# Patient Record
Sex: MALE
[De-identification: names, ages, dates, MRNs, and addresses within clinical notes are randomized per-mention and may not be internally consistent; named-entity substitution may affect disease eponyms.]

---

## 2024-08-05 PROBLEM — Z00.00 ENCOUNTER FOR PREVENTIVE HEALTH EXAMINATION: Status: ACTIVE | Noted: 2024-08-05

## 2024-08-07 ENCOUNTER — APPOINTMENT (OUTPATIENT)
Dept: OTOLARYNGOLOGY | Facility: CLINIC | Age: 54
End: 2024-08-07

## 2024-08-07 PROBLEM — Z87.891 FORMER SMOKER: Status: ACTIVE | Noted: 2024-08-07

## 2024-08-07 PROBLEM — Z78.9 SOCIAL ALCOHOL USE: Status: ACTIVE | Noted: 2024-08-07

## 2024-08-07 PROBLEM — Z82.49 FAMILY HISTORY OF CARDIAC DISORDER: Status: ACTIVE | Noted: 2024-08-07

## 2024-08-07 PROBLEM — Z86.39 HISTORY OF HYPERCHOLESTEROLEMIA: Status: RESOLVED | Noted: 2024-08-07 | Resolved: 2024-08-07

## 2024-08-07 PROBLEM — Z78.9 CAFFEINE USE: Status: ACTIVE | Noted: 2024-08-07

## 2024-08-07 PROBLEM — H90.3 SENSORINEURAL HEARING LOSS (SNHL) OF BOTH EARS: Status: ACTIVE | Noted: 2024-08-07

## 2024-08-07 PROBLEM — H93.299 ABNORMAL AUDITORY PERCEPTION: Status: ACTIVE | Noted: 2024-08-07

## 2024-08-07 PROCEDURE — 92557 COMPREHENSIVE HEARING TEST: CPT

## 2024-08-07 PROCEDURE — 92504 EAR MICROSCOPY EXAMINATION: CPT

## 2024-08-07 PROCEDURE — 92550 TYMPANOMETRY & REFLEX THRESH: CPT | Mod: 52

## 2024-08-07 PROCEDURE — 99203 OFFICE O/P NEW LOW 30 MIN: CPT

## 2024-08-07 NOTE — CONSULT LETTER
[Please see my note below.] : Please see my note below. [FreeTextEntry2] : Dear NO REEVES  [FreeTextEntry1] : Thank you for allowing me to participate in the care of SARAH CORREA . Please see the attached visit note.    Mac[facility] Le[facility] Medical Director of [facility] Department of Otolaryngology [facility]

## 2024-08-07 NOTE — PHYSICAL EXAM
[Normal] : mucosa is normal [Midline] : trachea located in midline position [FreeTextEntry1] : Procedure: Microscopic Ear Exam  Left ear:  Ear canal intact without inflammation or lesion.   Tympanic membrane intact without inflammation.  Right ear:  Ear canal intact without inflammation or lesion.   Tympanic membrane intact without inflammation.

## 2024-08-07 NOTE — DATA REVIEWED
[de-identified] : In light of the patients current symptoms, Complete audiometry was ordered and completed today. I have interpreted these results and reviewed them in detail with the patient.  Bilateral sensorineural hearing loss affecting the right ear greater than the left with impaired speech recognition

## 2024-08-07 NOTE — HISTORY OF PRESENT ILLNESS
[de-identified] : SARAH CORREA has a history of hearing loss of long duration. Used amplification without satisfactory results.  Having increasing difficulty with communication; missing many conversations. Using speech reading heavily.  Sudden dizziness 2 weeks ago upon arising from sitting position described as imbalance without vertigo. No symptoms of rest. Treated and released from ED with no serious illness identified.  FH: No hearing loss. PMH: denies prior hospitalizations; no  or childhood infections reported.

## 2024-08-07 NOTE — ASSESSMENT
[FreeTextEntry1] : I have reviewed the audiometric findings in detail and the management options. I have recommended considering amplification for hearing loss and offered a referral to the [facility].   Follow-up with repeat audiometry in 6 months.

## 2024-08-19 ENCOUNTER — APPOINTMENT (OUTPATIENT)
Dept: OTOLARYNGOLOGY | Facility: CLINIC | Age: 54
End: 2024-08-19

## 2024-08-20 ENCOUNTER — APPOINTMENT (OUTPATIENT)
Dept: OTOLARYNGOLOGY | Facility: CLINIC | Age: 54
End: 2024-08-20
Payer: COMMERCIAL

## 2024-08-20 PROCEDURE — V5010 ASSESSMENT FOR HEARING AID: CPT | Mod: NC

## 2024-09-04 ENCOUNTER — APPOINTMENT (OUTPATIENT)
Dept: OTOLARYNGOLOGY | Facility: CLINIC | Age: 54
End: 2024-09-04
Payer: COMMERCIAL

## 2024-09-04 PROCEDURE — V5261A: CUSTOM

## 2024-09-18 ENCOUNTER — APPOINTMENT (OUTPATIENT)
Dept: OTOLARYNGOLOGY | Facility: CLINIC | Age: 54
End: 2024-09-18

## 2025-02-03 ENCOUNTER — APPOINTMENT (OUTPATIENT)
Dept: OTOLARYNGOLOGY | Facility: CLINIC | Age: 55
End: 2025-02-03